# Patient Record
Sex: MALE | Race: WHITE | NOT HISPANIC OR LATINO | Employment: FULL TIME | URBAN - METROPOLITAN AREA
[De-identification: names, ages, dates, MRNs, and addresses within clinical notes are randomized per-mention and may not be internally consistent; named-entity substitution may affect disease eponyms.]

---

## 2017-04-25 ENCOUNTER — APPOINTMENT (OUTPATIENT)
Dept: GENERAL RADIOLOGY | Facility: HOSPITAL | Age: 27
End: 2017-04-25

## 2017-04-25 ENCOUNTER — HOSPITAL ENCOUNTER (EMERGENCY)
Facility: HOSPITAL | Age: 27
Discharge: HOME | End: 2017-04-25
Attending: EMERGENCY MEDICINE | Admitting: EMERGENCY MEDICINE

## 2017-04-25 VITALS
SYSTOLIC BLOOD PRESSURE: 140 MMHG | WEIGHT: 145 LBS | HEIGHT: 67 IN | BODY MASS INDEX: 22.76 KG/M2 | HEART RATE: 84 BPM | OXYGEN SATURATION: 98 % | TEMPERATURE: 97.6 F | RESPIRATION RATE: 16 BRPM | DIASTOLIC BLOOD PRESSURE: 86 MMHG

## 2017-04-25 DIAGNOSIS — J20.8 ACUTE VIRAL BRONCHITIS: Primary | ICD-10-CM

## 2017-04-25 LAB
ALBUMIN SERPL-MCNC: 4.3 G/DL (ref 3.5–5.2)
ALBUMIN/GLOB SERPL: 1.5 G/DL
ALP SERPL-CCNC: 89 U/L (ref 39–117)
ALT SERPL W P-5'-P-CCNC: 14 U/L (ref 1–41)
ANION GAP SERPL CALCULATED.3IONS-SCNC: 10.2 MMOL/L
AST SERPL-CCNC: 16 U/L (ref 1–40)
BASOPHILS # BLD AUTO: 0.04 10*3/MM3 (ref 0–0.2)
BASOPHILS NFR BLD AUTO: 0.4 % (ref 0–1.5)
BILIRUB SERPL-MCNC: 0.4 MG/DL (ref 0.1–1.2)
BILIRUB UR QL STRIP: NEGATIVE
BUN BLD-MCNC: 10 MG/DL (ref 6–20)
BUN/CREAT SERPL: 11.9 (ref 7–25)
CALCIUM SPEC-SCNC: 9.6 MG/DL (ref 8.6–10.5)
CHLORIDE SERPL-SCNC: 99 MMOL/L (ref 98–107)
CLARITY UR: CLEAR
CO2 SERPL-SCNC: 31.8 MMOL/L (ref 22–29)
COLOR UR: YELLOW
CREAT BLD-MCNC: 0.84 MG/DL (ref 0.76–1.27)
DEPRECATED RDW RBC AUTO: 42.2 FL (ref 37–54)
EOSINOPHIL # BLD AUTO: 0.48 10*3/MM3 (ref 0–0.7)
EOSINOPHIL NFR BLD AUTO: 5.3 % (ref 0.3–6.2)
ERYTHROCYTE [DISTWIDTH] IN BLOOD BY AUTOMATED COUNT: 12.6 % (ref 11.5–14.5)
FLUAV AG NPH QL: NEGATIVE
FLUBV AG NPH QL IA: NEGATIVE
GFR SERPL CREATININE-BSD FRML MDRD: 109 ML/MIN/1.73
GLOBULIN UR ELPH-MCNC: 2.9 GM/DL
GLUCOSE BLD-MCNC: 111 MG/DL (ref 65–99)
GLUCOSE UR STRIP-MCNC: NEGATIVE MG/DL
HCT VFR BLD AUTO: 44.5 % (ref 40.4–52.2)
HGB BLD-MCNC: 15.4 G/DL (ref 13.7–17.6)
HGB UR QL STRIP.AUTO: NEGATIVE
HOLD SPECIMEN: NORMAL
HOLD SPECIMEN: NORMAL
IMM GRANULOCYTES # BLD: 0.02 10*3/MM3 (ref 0–0.03)
IMM GRANULOCYTES NFR BLD: 0.2 % (ref 0–0.5)
KETONES UR QL STRIP: NEGATIVE
LEUKOCYTE ESTERASE UR QL STRIP.AUTO: NEGATIVE
LIPASE SERPL-CCNC: 19 U/L (ref 13–60)
LYMPHOCYTES # BLD AUTO: 2.22 10*3/MM3 (ref 0.9–4.8)
LYMPHOCYTES NFR BLD AUTO: 24.6 % (ref 19.6–45.3)
MCH RBC QN AUTO: 31.6 PG (ref 27–32.7)
MCHC RBC AUTO-ENTMCNC: 34.6 G/DL (ref 32.6–36.4)
MCV RBC AUTO: 91.4 FL (ref 79.8–96.2)
MONOCYTES # BLD AUTO: 0.73 10*3/MM3 (ref 0.2–1.2)
MONOCYTES NFR BLD AUTO: 8.1 % (ref 5–12)
NEUTROPHILS # BLD AUTO: 5.53 10*3/MM3 (ref 1.9–8.1)
NEUTROPHILS NFR BLD AUTO: 61.4 % (ref 42.7–76)
NITRITE UR QL STRIP: NEGATIVE
PH UR STRIP.AUTO: 5.5 [PH] (ref 5–8)
PLATELET # BLD AUTO: 200 10*3/MM3 (ref 140–500)
PMV BLD AUTO: 9 FL (ref 6–12)
POTASSIUM BLD-SCNC: 3.6 MMOL/L (ref 3.5–5.2)
PROT SERPL-MCNC: 7.2 G/DL (ref 6–8.5)
PROT UR QL STRIP: NEGATIVE
RBC # BLD AUTO: 4.87 10*6/MM3 (ref 4.6–6)
SODIUM BLD-SCNC: 141 MMOL/L (ref 136–145)
SP GR UR STRIP: 1.02 (ref 1–1.03)
UROBILINOGEN UR QL STRIP: NORMAL
WBC NRBC COR # BLD: 9.02 10*3/MM3 (ref 4.5–10.7)
WHOLE BLOOD HOLD SPECIMEN: NORMAL
WHOLE BLOOD HOLD SPECIMEN: NORMAL

## 2017-04-25 PROCEDURE — 99284 EMERGENCY DEPT VISIT MOD MDM: CPT

## 2017-04-25 PROCEDURE — 81003 URINALYSIS AUTO W/O SCOPE: CPT | Performed by: EMERGENCY MEDICINE

## 2017-04-25 PROCEDURE — 71020 HC CHEST PA AND LATERAL: CPT

## 2017-04-25 PROCEDURE — 83690 ASSAY OF LIPASE: CPT | Performed by: EMERGENCY MEDICINE

## 2017-04-25 PROCEDURE — 85025 COMPLETE CBC W/AUTO DIFF WBC: CPT | Performed by: EMERGENCY MEDICINE

## 2017-04-25 PROCEDURE — 80053 COMPREHEN METABOLIC PANEL: CPT | Performed by: EMERGENCY MEDICINE

## 2017-04-25 PROCEDURE — 87804 INFLUENZA ASSAY W/OPTIC: CPT | Performed by: EMERGENCY MEDICINE

## 2017-04-25 RX ORDER — ALBUTEROL SULFATE 90 UG/1
2 AEROSOL, METERED RESPIRATORY (INHALATION) EVERY 4 HOURS PRN
Qty: 1 INHALER | Refills: 0 | OUTPATIENT
Start: 2017-04-25 | End: 2023-03-17

## 2017-04-25 RX ORDER — GUAIFENESIN AND CODEINE PHOSPHATE 100; 10 MG/5ML; MG/5ML
5 SOLUTION ORAL 3 TIMES DAILY PRN
Qty: 118 ML | Refills: 0 | OUTPATIENT
Start: 2017-04-25 | End: 2023-03-17

## 2017-04-25 RX ORDER — SODIUM CHLORIDE 0.9 % (FLUSH) 0.9 %
10 SYRINGE (ML) INJECTION AS NEEDED
Status: DISCONTINUED | OUTPATIENT
Start: 2017-04-25 | End: 2017-04-25 | Stop reason: HOSPADM

## 2017-04-25 NOTE — ED PROVIDER NOTES
EMERGENCY DEPARTMENT ENCOUNTER       CHIEF COMPLAINT  Chief Complaint: Cough  History given by: Patient, Friend  History limited by: N/A  Room Number: 12/12  PMD: No Known Provider      HPI:  HPI Comments: Pt presents to the ED c/o cough and nasal congestion onset about 3 days ago. Pt has also had subjective fever and post-tussive emesis, but denies diarrhea, abd pain, CP, HA, neck pain/stiffness, and dyspnea. Pt reports that he may have had recent sick contact from work. There are no other complaints at this time.     Patient is a 27 y.o. male presenting with cough.   Cough   Cough characteristics:  Dry  Severity:  Moderate  Onset quality:  Gradual  Duration: onset about 3 days ago.  Timing:  Intermittent  Progression:  Waxing and waning  Context: sick contacts (possibly)    Relieved by:  Nothing  Worsened by:  Nothing  Associated symptoms: fever (subjective)    Associated symptoms: no chest pain, no chills, no headaches, no myalgias, no rash, no rhinorrhea, no shortness of breath, no sinus congestion, no sore throat and no wheezing          PAST MEDICAL HISTORY  Active Ambulatory Problems     Diagnosis Date Noted   • No Active Ambulatory Problems     Resolved Ambulatory Problems     Diagnosis Date Noted   • No Resolved Ambulatory Problems     No Additional Past Medical History         PAST SURGICAL HISTORY  History reviewed. No pertinent surgical history.      FAMILY HISTORY  History reviewed. No pertinent family history.      SOCIAL HISTORY  Social History     Social History   • Marital status: Single     Spouse name: N/A   • Number of children: N/A   • Years of education: N/A     Occupational History   • Not on file.     Social History Main Topics   • Smoking status: Never Smoker   • Smokeless tobacco: Not on file   • Alcohol use Defer   • Drug use: Defer   • Sexual activity: Defer     Other Topics Concern   • Not on file     Social History Narrative   • No narrative on file         ALLERGIES  Review of  patient's allergies indicates no known allergies.        REVIEW OF SYSTEMS  Review of Systems   Constitutional: Positive for fever (subjective). Negative for chills.   HENT: Positive for congestion (nasal ). Negative for rhinorrhea and sore throat.    Eyes: Negative for pain.   Respiratory: Positive for cough. Negative for shortness of breath and wheezing.    Cardiovascular: Negative for chest pain, palpitations and leg swelling.   Gastrointestinal: Positive for nausea and vomiting (post-tussive). Negative for abdominal pain and diarrhea.   Genitourinary: Negative for difficulty urinating, dysuria, flank pain and frequency.   Musculoskeletal: Negative for myalgias, neck pain and neck stiffness.   Skin: Negative for rash.   Neurological: Negative for dizziness, speech difficulty, weakness, light-headedness, numbness and headaches.   Psychiatric/Behavioral: Negative.    All other systems reviewed and are negative.           PHYSICAL EXAM  ED Triage Vitals   Temp Heart Rate Resp BP SpO2   04/25/17 0632 04/25/17 0632 04/25/17 0632 04/25/17 0637 04/25/17 0632   97.6 °F (36.4 °C) 104 18 141/70 100 % WNL      Temp src Heart Rate Source Patient Position BP Location FiO2 (%)   04/25/17 0632 04/25/17 0632 04/25/17 0637 -- --   Tympanic Monitor Sitting         Physical Exam   Constitutional: He is oriented to person, place, and time. No distress.   HENT:   Head: Normocephalic.   Mouth/Throat: Mucous membranes are normal. No oropharyngeal exudate or posterior oropharyngeal erythema.   Eyes: EOM are normal. Pupils are equal, round, and reactive to light.   Neck: Normal range of motion. Neck supple.   Cardiovascular: Normal rate, regular rhythm and normal heart sounds.    Pulmonary/Chest: Effort normal and breath sounds normal. No respiratory distress. He has no decreased breath sounds. He has no wheezes. He has no rhonchi. He has no rales.   Abdominal: Soft. There is no tenderness. There is no rebound and no guarding.    Musculoskeletal: Normal range of motion.   Neurological: He is alert and oriented to person, place, and time. He has normal sensation.   Skin: Skin is warm and dry.   Psychiatric: Mood and affect normal.   Nursing note and vitals reviewed.          LAB RESULTS  Recent Results (from the past 24 hour(s))   Comprehensive Metabolic Panel    Collection Time: 04/25/17  7:04 AM   Result Value Ref Range    Glucose 111 (H) 65 - 99 mg/dL    BUN 10 6 - 20 mg/dL    Creatinine 0.84 0.76 - 1.27 mg/dL    Sodium 141 136 - 145 mmol/L    Potassium 3.6 3.5 - 5.2 mmol/L    Chloride 99 98 - 107 mmol/L    CO2 31.8 (H) 22.0 - 29.0 mmol/L    Calcium 9.6 8.6 - 10.5 mg/dL    Total Protein 7.2 6.0 - 8.5 g/dL    Albumin 4.30 3.50 - 5.20 g/dL    ALT (SGPT) 14 1 - 41 U/L    AST (SGOT) 16 1 - 40 U/L    Alkaline Phosphatase 89 39 - 117 U/L    Total Bilirubin 0.4 0.1 - 1.2 mg/dL    eGFR Non African Amer 109 >60 mL/min/1.73    Globulin 2.9 gm/dL    A/G Ratio 1.5 g/dL    BUN/Creatinine Ratio 11.9 7.0 - 25.0    Anion Gap 10.2 mmol/L   Lipase    Collection Time: 04/25/17  7:04 AM   Result Value Ref Range    Lipase 19 13 - 60 U/L   Light Blue Top    Collection Time: 04/25/17  7:04 AM   Result Value Ref Range    Extra Tube hold for add-on    Green Top (Gel)    Collection Time: 04/25/17  7:04 AM   Result Value Ref Range    Extra Tube Hold for add-ons.    Lavender Top    Collection Time: 04/25/17  7:04 AM   Result Value Ref Range    Extra Tube hold for add-on    Gold Top - SST    Collection Time: 04/25/17  7:04 AM   Result Value Ref Range    Extra Tube Hold for add-ons.    CBC Auto Differential    Collection Time: 04/25/17  7:04 AM   Result Value Ref Range    WBC 9.02 4.50 - 10.70 10*3/mm3    RBC 4.87 4.60 - 6.00 10*6/mm3    Hemoglobin 15.4 13.7 - 17.6 g/dL    Hematocrit 44.5 40.4 - 52.2 %    MCV 91.4 79.8 - 96.2 fL    MCH 31.6 27.0 - 32.7 pg    MCHC 34.6 32.6 - 36.4 g/dL    RDW 12.6 11.5 - 14.5 %    RDW-SD 42.2 37.0 - 54.0 fl    MPV 9.0 6.0 - 12.0 fL     Platelets 200 140 - 500 10*3/mm3    Neutrophil % 61.4 42.7 - 76.0 %    Lymphocyte % 24.6 19.6 - 45.3 %    Monocyte % 8.1 5.0 - 12.0 %    Eosinophil % 5.3 0.3 - 6.2 %    Basophil % 0.4 0.0 - 1.5 %    Immature Grans % 0.2 0.0 - 0.5 %    Neutrophils, Absolute 5.53 1.90 - 8.10 10*3/mm3    Lymphocytes, Absolute 2.22 0.90 - 4.80 10*3/mm3    Monocytes, Absolute 0.73 0.20 - 1.20 10*3/mm3    Eosinophils, Absolute 0.48 0.00 - 0.70 10*3/mm3    Basophils, Absolute 0.04 0.00 - 0.20 10*3/mm3    Immature Grans, Absolute 0.02 0.00 - 0.03 10*3/mm3   Urinalysis With / Culture If Indicated    Collection Time: 04/25/17  8:05 AM   Result Value Ref Range    Color, UA Yellow Yellow, Straw    Appearance, UA Clear Clear    pH, UA 5.5 5.0 - 8.0    Specific Gravity, UA 1.024 1.005 - 1.030    Glucose, UA Negative Negative    Ketones, UA Negative Negative    Bilirubin, UA Negative Negative    Blood, UA Negative Negative    Protein, UA Negative Negative    Leuk Esterase, UA Negative Negative    Nitrite, UA Negative Negative    Urobilinogen, UA 0.2 E.U./dL 0.2 - 1.0 E.U./dL   Influenza Antigen    Collection Time: 04/25/17  8:26 AM   Result Value Ref Range    Influenza A Ag, EIA Negative Negative    Influenza B Ag, EIA Negative Negative       Ordered the above labs and reviewed the results.        RADIOLOGY  XR Chest 2 View - Negative acute. Interpreted by radiologist. Independently viewed by me.             Ordered the above noted radiological studies. Reviewed by me in PACS.       PROCEDURES  Procedures        PROGRESS AND CONSULTS  ED Course   Comment By Time   9:39 AM  Patient with cough and congestion.  Post tussive emesis.  Most likely viral syndrome.  Will discharge home.  Follow up with PMD.  Trial of albuterol and robitussin with codeine Austin Hawley MD 04/25 9334     8:24 AM: Influenza screen and CXR ordered for further evaluation.     9:31 AM: Rechecked pt. Pt is resting comfortably and appears in no acute distress. Informed pt  that his WBC is 9. Flu screen is negative. CXR is negative acute. Will prescribe rx for anti-tussive and albuterol. Will hold antibiotics at this time as pt's WBC is WNL. Pt will be provided with [facility] referral for f/u. RTER warnings given. Pt agrees with plan for discharge.         MEDICAL DECISION MAKING      MDM  Number of Diagnoses or Management Options     Amount and/or Complexity of Data Reviewed  Clinical lab tests: ordered and reviewed (WBC is 9.02. )  Tests in the radiology section of CPT®: ordered and reviewed (CXR is negative acute.)    Patient Progress  Patient progress: stable             DIAGNOSIS  Final diagnoses:   Acute viral bronchitis         DISPOSITION  Pt discharged.      DISCHARGE    Patient discharged in stable condition.    Reviewed implications of results, diagnosis, meds, responsibility to follow up, warning signs and symptoms of possible worsening, potential complications and reasons to return to ER.    Patient/Family voiced understanding of above instructions.    Discussed plan for discharge, as there is no emergent indication for admission.  Pt/family is agreeable and understands need for follow up and repeat testing.  Pt is aware that discharge does not mean that nothing is wrong but it indicates no emergency is present that requires admission and they must continue care with follow-up as given below or physician of their choice.     FOLLOW-UP  [facility] PHYSICAN REFERRAL SERVICE  [facility] [place] [address] [phone]  Schedule an appointment as soon as possible for a visit           Medication List      New Prescriptions          albuterol 108 (90 BASE) MCG/ACT inhaler   Commonly known as:  PROVENTIL HFA;VENTOLIN HFA   Inhale 2 puffs Every 4 (Four) Hours As Needed for Wheezing.       guaifenesin-codeine 100-10 MG/5ML liquid   Commonly known as:  GUAIFENESIN AC   Take 5 mL by mouth 3 (Three) Times a Day As Needed for Cough.                   Latest Documented Vital  Signs:  As of 9:41 AM  BP- 132/92 HR- 79 Temp- 97.6 °F (36.4 °C) (Tympanic) O2 sat- 98%      --  Documentation assistance provided by hien Martin for Dr. Marleen MD.  Information recorded by the scribe was done at my direction and has been verified and validated by me.                 Arcelia Martin  04/25/17 1646       Austin Hawley MD  04/25/17 3343

## 2017-04-25 NOTE — ED TRIAGE NOTES
Chief Complaint   Patient presents with   • Cough   • Vomiting     Patient c/o head pain, and stomach pain with a cough and vomiting since Saturday afternoon

## 2017-05-11 ENCOUNTER — APPOINTMENT (OUTPATIENT)
Dept: GENERAL RADIOLOGY | Facility: HOSPITAL | Age: 27
End: 2017-05-11

## 2017-05-11 ENCOUNTER — TELEPHONE (OUTPATIENT)
Dept: URGENT CARE | Facility: CLINIC | Age: 27
End: 2017-05-11

## 2017-05-11 ENCOUNTER — HOSPITAL ENCOUNTER (EMERGENCY)
Facility: HOSPITAL | Age: 27
Discharge: HOME | End: 2017-05-11
Attending: EMERGENCY MEDICINE | Admitting: EMERGENCY MEDICINE

## 2017-05-11 VITALS
RESPIRATION RATE: 16 BRPM | HEART RATE: 79 BPM | TEMPERATURE: 98.2 F | WEIGHT: 130 LBS | SYSTOLIC BLOOD PRESSURE: 110 MMHG | OXYGEN SATURATION: 100 % | BODY MASS INDEX: 20.4 KG/M2 | HEIGHT: 67 IN | DIASTOLIC BLOOD PRESSURE: 75 MMHG

## 2017-05-11 DIAGNOSIS — B34.8 RHINOVIRUS INFECTION: ICD-10-CM

## 2017-05-11 DIAGNOSIS — J20.8 ACUTE VIRAL BRONCHITIS: Primary | ICD-10-CM

## 2017-05-11 LAB
ALBUMIN SERPL-MCNC: 4.1 G/DL (ref 3.5–5.2)
ALBUMIN/GLOB SERPL: 1.4 G/DL
ALP SERPL-CCNC: 103 U/L (ref 39–117)
ALT SERPL W P-5'-P-CCNC: 13 U/L (ref 1–41)
ANION GAP SERPL CALCULATED.3IONS-SCNC: 7.8 MMOL/L
AST SERPL-CCNC: 18 U/L (ref 1–40)
B PERT DNA SPEC QL NAA+PROBE: NOT DETECTED
BASOPHILS # BLD AUTO: 0.06 10*3/MM3 (ref 0–0.2)
BASOPHILS NFR BLD AUTO: 0.8 % (ref 0–1.5)
BILIRUB SERPL-MCNC: 0.7 MG/DL (ref 0.1–1.2)
BILIRUB UR QL STRIP: NEGATIVE
BUN BLD-MCNC: 8 MG/DL (ref 6–20)
BUN/CREAT SERPL: 8.2 (ref 7–25)
C PNEUM DNA NPH QL NAA+NON-PROBE: NOT DETECTED
CALCIUM SPEC-SCNC: 9.1 MG/DL (ref 8.6–10.5)
CHLORIDE SERPL-SCNC: 100 MMOL/L (ref 98–107)
CLARITY UR: CLEAR
CO2 SERPL-SCNC: 32.2 MMOL/L (ref 22–29)
COLOR UR: YELLOW
CREAT BLD-MCNC: 0.98 MG/DL (ref 0.76–1.27)
DEPRECATED RDW RBC AUTO: 43.2 FL (ref 37–54)
EOSINOPHIL # BLD AUTO: 0.58 10*3/MM3 (ref 0–0.7)
EOSINOPHIL NFR BLD AUTO: 7.5 % (ref 0.3–6.2)
ERYTHROCYTE [DISTWIDTH] IN BLOOD BY AUTOMATED COUNT: 12.9 % (ref 11.5–14.5)
FLUAV H1 2009 PAND RNA NPH QL NAA+PROBE: NOT DETECTED
FLUAV H1 HA GENE NPH QL NAA+PROBE: NOT DETECTED
FLUAV H3 RNA NPH QL NAA+PROBE: NOT DETECTED
FLUAV SUBTYP SPEC NAA+PROBE: NOT DETECTED
FLUBV RNA ISLT QL NAA+PROBE: NOT DETECTED
GFR SERPL CREATININE-BSD FRML MDRD: 91 ML/MIN/1.73
GLOBULIN UR ELPH-MCNC: 3 GM/DL
GLUCOSE BLD-MCNC: 92 MG/DL (ref 65–99)
GLUCOSE UR STRIP-MCNC: NEGATIVE MG/DL
HADV DNA SPEC NAA+PROBE: NOT DETECTED
HCOV 229E RNA SPEC QL NAA+PROBE: NOT DETECTED
HCOV HKU1 RNA SPEC QL NAA+PROBE: NOT DETECTED
HCOV NL63 RNA SPEC QL NAA+PROBE: NOT DETECTED
HCOV OC43 RNA SPEC QL NAA+PROBE: NOT DETECTED
HCT VFR BLD AUTO: 42.7 % (ref 40.4–52.2)
HGB BLD-MCNC: 14.8 G/DL (ref 13.7–17.6)
HGB UR QL STRIP.AUTO: NEGATIVE
HMPV RNA NPH QL NAA+NON-PROBE: NOT DETECTED
HOLD SPECIMEN: NORMAL
HOLD SPECIMEN: NORMAL
HPIV1 RNA SPEC QL NAA+PROBE: NOT DETECTED
HPIV2 RNA SPEC QL NAA+PROBE: NOT DETECTED
HPIV3 RNA NPH QL NAA+PROBE: NOT DETECTED
HPIV4 P GENE NPH QL NAA+PROBE: NOT DETECTED
IMM GRANULOCYTES # BLD: 0 10*3/MM3 (ref 0–0.03)
IMM GRANULOCYTES NFR BLD: 0 % (ref 0–0.5)
KETONES UR QL STRIP: NEGATIVE
LEUKOCYTE ESTERASE UR QL STRIP.AUTO: NEGATIVE
LIPASE SERPL-CCNC: 18 U/L (ref 13–60)
LYMPHOCYTES # BLD AUTO: 1.5 10*3/MM3 (ref 0.9–4.8)
LYMPHOCYTES NFR BLD AUTO: 19.4 % (ref 19.6–45.3)
M PNEUMO IGG SER IA-ACNC: NOT DETECTED
MCH RBC QN AUTO: 31.9 PG (ref 27–32.7)
MCHC RBC AUTO-ENTMCNC: 34.7 G/DL (ref 32.6–36.4)
MCV RBC AUTO: 92 FL (ref 79.8–96.2)
MONOCYTES # BLD AUTO: 0.89 10*3/MM3 (ref 0.2–1.2)
MONOCYTES NFR BLD AUTO: 11.5 % (ref 5–12)
NEUTROPHILS # BLD AUTO: 4.71 10*3/MM3 (ref 1.9–8.1)
NEUTROPHILS NFR BLD AUTO: 60.8 % (ref 42.7–76)
NITRITE UR QL STRIP: NEGATIVE
PH UR STRIP.AUTO: <=5 [PH] (ref 5–8)
PLATELET # BLD AUTO: 202 10*3/MM3 (ref 140–500)
PMV BLD AUTO: 8.9 FL (ref 6–12)
POTASSIUM BLD-SCNC: 3.6 MMOL/L (ref 3.5–5.2)
PROT SERPL-MCNC: 7.1 G/DL (ref 6–8.5)
PROT UR QL STRIP: NEGATIVE
RBC # BLD AUTO: 4.64 10*6/MM3 (ref 4.6–6)
RHINOVIRUS RNA SPEC NAA+PROBE: DETECTED
RSV RNA NPH QL NAA+NON-PROBE: NOT DETECTED
SODIUM BLD-SCNC: 140 MMOL/L (ref 136–145)
SP GR UR STRIP: 1.03 (ref 1–1.03)
UROBILINOGEN UR QL STRIP: NORMAL
WBC NRBC COR # BLD: 7.74 10*3/MM3 (ref 4.5–10.7)
WHOLE BLOOD HOLD SPECIMEN: NORMAL
WHOLE BLOOD HOLD SPECIMEN: NORMAL

## 2017-05-11 PROCEDURE — 71020 HC CHEST PA AND LATERAL: CPT

## 2017-05-11 PROCEDURE — 87486 CHLMYD PNEUM DNA AMP PROBE: CPT | Performed by: EMERGENCY MEDICINE

## 2017-05-11 PROCEDURE — 87633 RESP VIRUS 12-25 TARGETS: CPT | Performed by: EMERGENCY MEDICINE

## 2017-05-11 PROCEDURE — 80053 COMPREHEN METABOLIC PANEL: CPT | Performed by: EMERGENCY MEDICINE

## 2017-05-11 PROCEDURE — 99284 EMERGENCY DEPT VISIT MOD MDM: CPT

## 2017-05-11 PROCEDURE — 81003 URINALYSIS AUTO W/O SCOPE: CPT | Performed by: EMERGENCY MEDICINE

## 2017-05-11 PROCEDURE — 25010000002 ONDANSETRON PER 1 MG

## 2017-05-11 PROCEDURE — 87581 M.PNEUMON DNA AMP PROBE: CPT | Performed by: EMERGENCY MEDICINE

## 2017-05-11 PROCEDURE — 83690 ASSAY OF LIPASE: CPT | Performed by: EMERGENCY MEDICINE

## 2017-05-11 PROCEDURE — 87798 DETECT AGENT NOS DNA AMP: CPT | Performed by: EMERGENCY MEDICINE

## 2017-05-11 PROCEDURE — 85025 COMPLETE CBC W/AUTO DIFF WBC: CPT | Performed by: EMERGENCY MEDICINE

## 2017-05-11 PROCEDURE — 96374 THER/PROPH/DIAG INJ IV PUSH: CPT

## 2017-05-11 RX ORDER — CLARITHROMYCIN 500 MG/1
500 TABLET, COATED ORAL 2 TIMES DAILY
Qty: 28 TABLET | Refills: 0 | Status: SHIPPED | OUTPATIENT
Start: 2017-05-11 | End: 2018-03-07

## 2017-05-11 RX ORDER — ONDANSETRON 2 MG/ML
4 INJECTION INTRAMUSCULAR; INTRAVENOUS ONCE
Status: COMPLETED | OUTPATIENT
Start: 2017-05-11 | End: 2017-05-11

## 2017-05-11 RX ORDER — ONDANSETRON 4 MG/1
4 TABLET, FILM COATED ORAL 4 TIMES DAILY PRN
Qty: 20 TABLET | Refills: 0 | OUTPATIENT
Start: 2017-05-11 | End: 2023-03-17

## 2017-05-11 RX ORDER — ONDANSETRON 2 MG/ML
INJECTION INTRAMUSCULAR; INTRAVENOUS
Status: COMPLETED
Start: 2017-05-11 | End: 2017-05-11

## 2017-05-11 RX ORDER — PREDNISONE 20 MG/1
60 TABLET ORAL DAILY
Qty: 15 TABLET | Refills: 0 | OUTPATIENT
Start: 2017-05-11 | End: 2023-03-17

## 2017-05-11 RX ORDER — SODIUM CHLORIDE 0.9 % (FLUSH) 0.9 %
10 SYRINGE (ML) INJECTION AS NEEDED
Status: DISCONTINUED | OUTPATIENT
Start: 2017-05-11 | End: 2017-05-11 | Stop reason: HOSPADM

## 2017-05-11 RX ADMIN — ONDANSETRON 4 MG: 2 INJECTION INTRAMUSCULAR; INTRAVENOUS at 18:12

## 2021-04-16 ENCOUNTER — BULK ORDERING (OUTPATIENT)
Dept: CASE MANAGEMENT | Facility: OTHER | Age: 31
End: 2021-04-16

## 2021-04-16 DIAGNOSIS — Z23 IMMUNIZATION DUE: ICD-10-CM

## 2024-10-22 ENCOUNTER — E-VISIT (OUTPATIENT)
Dept: FAMILY MEDICINE CLINIC | Facility: TELEHEALTH | Age: 34
End: 2024-10-22

## 2024-10-22 PROCEDURE — FABRICHEALTHVISIT: Performed by: NURSE PRACTITIONER

## 2024-10-22 NOTE — E-VISIT TREATED
Date: 10/22/2024 16:07:11  Clinician: Marilia Perez  Clinician NPI: [id number]  Patient: Philippe Messina  Patient : 1990  Patient Address: [address] , [place] [address]  Patient Phone: [phone]  Visit Protocol: URI  Patient Summary:  Philippe is a 34 year old ( : 1990 ) male who initiated a visit for cold, sinus infection, or influenza.     Philippe states the symptoms started gradually 2-3 weeks ago.   Symptom start date: 10/02/2024   The symptoms   consist of diarrhea, malaise, facial pain or pressure, rhinitis, vomiting, nasal congestion, and nausea. Philippe is experiencing difficulty breathing due to nasal congestion but is not short of breath.   Symptom details     Nasal secretions: The color of the mucus is white and yellow.    Facial pain or pressure: The facial pain or pressure does not feel worse when bending or leaning forward.      Philippe denies having fever, ageusia, cough, chills, anosmia, sore throat, wheezing, ear pain, headache, myalgias, and teeth pain. Philippe also denies having a sinus infection within the past year, having recent facial or sinus surgery in the past   60 days, taking antibiotic medication in the past month, and double sickening (worsening symptoms after initial improvement).     Pertinent COVID-19 (Coronavirus) information  Since the symptoms started, Philippe has tested for COVID-19. The result of   the test was negative.   Philippe has not had COVID-19 in the last 3 months.   Philippe has not received a COVID-19 vaccine in the past year.   Pertinent medical history     A provider has not told Philippe to avoid NSAIDs.   Philippe does not have   diabetes. Philippe denies having immunosuppressive conditions (e.g., chemotherapy, HIV, organ transplant, long-term use of steroids or other immunosuppressive medications, splenectomy). Philippe does not have asthma.   Philippe denies having chronic   lung disease, cystic fibrosis,  hypertension, long-term disabilities, mental health conditions, sickle cell disease or thalassemia, stroke or other cardiovascular disease, substance use disorders, or tuberculosis (TB).  Philippe does not smoke or use   smokeless tobacco. Philippe does not vape or use other e-cigarette products.   Additional information as reported by the patient (free text): Amoxcillian, cheapest option for it, has always helped. I feel I have a sinus infection pre michi as   mucus/sinues and now throat are affected just like last years same situation / diagnosis   Weight: 160 lbs (72.57 kg)    MEDICATIONS: No current medications, ALLERGIES: NKDA  Clinician Response:  Dear Philippe,  Based on the information provided, you have acute bacterial sinusitis, also known as a sinus infection. Sinus infections are caused by bacteria or a virus and symptoms are almost always identical. The difference   between the 2 types of infections is timing.  Sinus infections start as viral infections and symptoms improve on their own in about 7 days. A bacterial infection may have developed if any of the following apply to you:     You have had 7 days of symptoms and are experiencing at least 2 of the following:       Fever    Facial pressure or headache    Green or yellow nasal mucus    Symptoms that improved and then got worse again       You have had symptoms for 10 or more days     Medication information  I am prescribing:     Amoxicillin 500 mg oral tablet. Take 1 tablet by mouth every 8 hours for 7 days. There are no refills with this prescription.   Self care  Steps you can take to be as comfortable as possible:     Rest.    Drink plenty of fluids.    Take a warm shower to loosen congestion    Use a cool-mist humidifier.     When to seek care  Please be seen in a clinic or urgent care if any of the following occur:     New symptoms develop, or symptoms become worse    Symptoms do not start to improve after 3 days of treatment     It is  possible to have an allergic reaction to an antibiotic even if you have not had one in the past. If you notice a new rash, significant swelling, or difficulty breathing, stop taking this medication immediately and go to a clinic or urgent care.    For the latest updates on COVID-19 (Coronavirus), please visit the Centers for Disease Control and Prevention (CDC). Also, your state and local health department websites may provide additional guidance regarding testing and isolation recommendations for   your location.   Diagnosis: Acute bacterial sinusitis  Diagnosis ICD: J01.90    Follow up instructions:  ATTENTION: If you have been prescribed medications, your prescriptions will not be sent until you choose your pharmacy. To do so open the link within your notification, or go to [employer] and click eVisit in the menu to open your   treatment plan. From there, you can select your pharmacy at the bottom of your after visit summary. You can also go to [email or web address]   Prescriptions  Prescription: amoxicillin 500 mg oral tablet, take 1 tablet by mouth every 8 hours for 7 days  Sent To: [facility] - [phone] - [address] [place] [address]